# Patient Record
Sex: FEMALE | Race: WHITE | NOT HISPANIC OR LATINO | URBAN - METROPOLITAN AREA
[De-identification: names, ages, dates, MRNs, and addresses within clinical notes are randomized per-mention and may not be internally consistent; named-entity substitution may affect disease eponyms.]

---

## 2021-11-15 ENCOUNTER — APPOINTMENT (RX ONLY)
Dept: URBAN - METROPOLITAN AREA CLINIC 307 | Facility: CLINIC | Age: 46
Setting detail: DERMATOLOGY
End: 2021-11-15

## 2021-11-15 DIAGNOSIS — Z85.828 PERSONAL HISTORY OF OTHER MALIGNANT NEOPLASM OF SKIN: ICD-10-CM

## 2021-11-15 DIAGNOSIS — L73.2 HIDRADENITIS SUPPURATIVA: ICD-10-CM | Status: WORSENING

## 2021-11-15 DIAGNOSIS — L91.8 OTHER HYPERTROPHIC DISORDERS OF THE SKIN: ICD-10-CM

## 2021-11-15 PROCEDURE — ? PRESCRIPTION MEDICATION MANAGEMENT

## 2021-11-15 PROCEDURE — 99204 OFFICE O/P NEW MOD 45 MIN: CPT | Mod: 25

## 2021-11-15 PROCEDURE — ? COUNSELING

## 2021-11-15 PROCEDURE — ? ORDER TESTS

## 2021-11-15 PROCEDURE — 11200 RMVL SKIN TAGS UP TO&INC 15: CPT

## 2021-11-15 PROCEDURE — ? SKIN TAG REMOVAL

## 2021-11-15 ASSESSMENT — LOCATION DETAILED DESCRIPTION DERM
LOCATION DETAILED: MONS PUBIS
LOCATION DETAILED: LEFT AXILLARY VAULT
LOCATION DETAILED: RIGHT LATERAL EYEBROW
LOCATION DETAILED: LEFT CENTRAL EYEBROW
LOCATION DETAILED: LEFT SUPRAPUBIC SKIN
LOCATION DETAILED: RIGHT POSTERIOR AXILLA

## 2021-11-15 ASSESSMENT — LOCATION SIMPLE DESCRIPTION DERM
LOCATION SIMPLE: LEFT AXILLARY VAULT
LOCATION SIMPLE: RIGHT POSTERIOR AXILLA
LOCATION SIMPLE: RIGHT EYEBROW
LOCATION SIMPLE: GROIN
LOCATION SIMPLE: LEFT EYEBROW
LOCATION SIMPLE: GROIN

## 2021-11-15 ASSESSMENT — LOCATION ZONE DERM
LOCATION ZONE: TRUNK
LOCATION ZONE: FACE
LOCATION ZONE: AXILLAE
LOCATION ZONE: VULVA

## 2021-11-15 NOTE — PROCEDURE: SKIN TAG REMOVAL
Include Z78.9 (Other Specified Conditions Influencing Health Status) As An Associated Diagnosis?: No
Add Associated Diagnoses If Applicable When Selecting Medical Necessity: Yes
Consent: Written consent obtained and the risks of skin tag removal was reviewed with the patient including but not limited to bleeding, pigmentary change, infection, pain, and remote possibility of scarring.
Anesthesia Type: 1% lidocaine with epinephrine
Anesthesia Volume In Cc: 3
Medical Necessity Clause: This procedure was medically necessary because the lesions that were treated were: irritated and catching
Medical Necessity Information: It is in your best interest to select a reason for this procedure from the list below. All of these items fulfill various CMS LCD requirements except the new and changing color options.
Detail Level: Detailed

## 2021-11-15 NOTE — HPI: RASH (HIDRADENITIS SUPPURATIVA)
How Severe Is It?: mild
Is This A New Presentation, Or A Follow-Up?: Rash
Additional History: Patient states she got the COVID-19 Zeyad and Zeyad vaccine on October 2nd and HS has gotten worse since receiving vaccine. PCP has injected rosefin antibiotic twice in the last 6 weeks

## 2022-01-17 ENCOUNTER — RX ONLY (OUTPATIENT)
Age: 47
Setting detail: RX ONLY
End: 2022-01-17

## 2022-01-17 RX ORDER — ADALIMUMAB 80MG/0.8ML
KIT SUBCUTANEOUS
Qty: 1 | Refills: 3 | Status: ERX | COMMUNITY
Start: 2022-01-17

## 2022-09-16 NOTE — PROCEDURE: ORDER TESTS
GI FOLLOW UP    INTERVAL HISTORY:       Status post colonoscopy with EMR of sessile polyp in the left-sided colon  Denies any significant GI symptoms  Pending evaluation by colorectal surgery for condyloma resection    No chief complaint on file. No diagnosis found. HISTORY OF PRESENT ILLNESS: Guzman Reyes is a 47 y.o. female with a past history remarkable for hypertension, depression, COPD, alcoholism with dependency and recent mission for withdrawal symptoms, additionally admitted for intoxication COPD exacerbation identified to have anemia with fecal occult positive results, underwent EGD and colonoscopy (poor prep) that revealed no obvious upper GI sources of the patient's anemia however did reveal severe condyloma involving the anorectal region and general region. Patient was referred to colorectal surgery and is undergoing evaluation to have her severe condyloma acuminata removed and treated for. Patient will need a repeat colonoscopy with extended bowel prep when she is treated for condyloma soon as possible as she has a residual flat polyps identified on her index colonoscopy that need to be resected. Is unlikely the patient is unable to tolerate bowel prep given that she has endoscopic evidence of severe gastroparesis. Her most recent upper endoscopy revealed significant to moderate amount of residual gastric food undigested suggestive of gastroparesis     Repeat upper endoscopy was performed abnormal esophagram which revealed a possible intraluminal narrowing in the midesophagus possible related to aortic arch compression. EGD revealed possible extraluminal narrowing in the midesophagus but no intraluminal lesions that were obvious.   Stomach again was filled with partially digested food suggestive of gastroparesis versus nonadherence to dietary instructions versus
09/29/2021    [place] [place]-last visit sep 2021    Under care of team 09/29/2021    pain management-Hamzah Martines-[facility]-last visit sep 2021    Under care of team     pulmonology-Dr Due[facility]as-[facility]-due for visit March 2022    Under care of team 09/29/2021    psych-bahnfeldt NP-telemed-last visit 10/ 2021    Under care of team 09/29/2021    yw-[address]-last visit aug 2021    Under care of team     NEPHROLOGY - DR. LEE    Wears glasses     Wears partial dentures     UPPER    Wellness examination     pcp-Ludivina grimes-last visit Jan 5, 2022       Past Surgical History:   Procedure Laterality Date    ANUS SURGERY N/A 01/25/2022    EXCISION AND FULGURATION, ANAL, VAGINAL AND PERIANAL WARTS WITH CO2 LASER, FORTEC performed by Reji De La Rosa MD at [facility] [address] times 2    COLONOSCOPY N/A 10/22/2020    COLONOSCOPY DIAGNOSTIC performed by Thor North MD at [facility]    COLONOSCOPY N/A 11/19/2021    COLONOSCOPY W/ ENDOSCOPIC MUCOSAL RESECTION performed by Thor North MD at [facility]    [address]  07/28/2021    CT ABSCESS DRAIN SUBCUTANEOUS 7/28/2021 [facility] CT SCAN    ENDOSCOPIC ULTRASOUND (LOWER) N/A 12/09/2020    ENDOSCOPIC ULTRASOUND, UPPER WITH LINEAR SCOPE FOR BIOPSY OF MASS ON HEAD OF PANCREAS performed by Meg Galvan MD at [facility] (UPPER)  02/09/2022    ENDOSCOPIC ULTRASOUND, EGD - GI SCHEDULED,EGD STENT REMOVAL    ERCP N/A 08/11/2021    ERCP STENT INSERTION performed by Yaquelin Heard MD at [facility]    ERCP  08/11/2021    ERCP SPHINCTER/PAPILLOTOMY performed by Yaquelin Heard MD at [facility]    ERCP N/A 10/21/2021    ERCP STENT REMOVAL/EXCHANGE [facility]    ERCP  10/21/2021    ERCP STONE REMOVAL performed by Yaquelin Heard MD at [place] [address]    ERCP  10/21/2021    ERCP ENDOSCOPIC RETROGRADE CHOLANGIOPANCREATOGRAPHY
DIRECT VISUALIZATION performed by Mabel Bynum MD at [address] [facility] Left 07/03/2013    ORIF tibial plateau    FRACTURE SURGERY Right     small finger metacarpal fracture    HAND SURGERY      HYSTERECTOMY (CERVIX STATUS UNKNOWN)  2003    SPINE SURGERY N/A 09/10/2021    KYPHOPLASTY lumbar L5 performed by Elle Mishra MD at [address] [facility] 10/22/2020    EGD BIOPSY performed by Kye Lam MD at [address] [place] [address] 04/05/2021    EGD BIOPSY performed by Kye Lam MD at [facility] N/A 09/08/2021    ENDOSCOPIC ULTRASOUND, LINEAR SCOPE performed by Devin Mancia MD at [address] [facility] N/A 2/9/2022    ENDOSCOPIC ULTRASOUND, EGD - GI SCHEDULED performed by Mabel Bynum MD at [address] [facility]  2/9/2022    EGD STENT REMOVAL performed by Mabel Bynum MD at [address] [facility]:    Current Outpatient Medications:     lipase-protease-amylase (CREON) 98642-76444 units delayed release capsule, TAKE ONE CAPSULE BY MOUTH THREE TIMES A DAY WITH MEALS, Disp: 90 capsule, Rfl: 2    amoxicillin (AMOXIL) 875 MG tablet, Take 1 tablet by mouth 2 times daily for 10 days, Disp: 20 tablet, Rfl: 0    atorvastatin (LIPITOR) 20 MG tablet, Take 1 tablet by mouth daily, Disp: 30 tablet, Rfl: 3    mirtazapine (REMERON) 15 MG tablet, , Disp: , Rfl:     REXULTI 1 MG TABS tablet, , Disp: , Rfl:     topiramate (TOPAMAX) 50 MG tablet, Take 1 tablet by mouth 2 times daily, Disp: 60 tablet, Rfl: 5    sucralfate (CARAFATE) 1 GM tablet, TAKE ONE TABLET BY MOUTH FOUR TIMES A DAY, Disp: 120 tablet, Rfl: 3    busPIRone (BUSPAR) 30 MG tablet, Take 1 tablet by mouth in the morning and at bedtime, Disp: , Rfl:     solifenacin (VESICARE) 10 MG tablet, Take 1 tablet by mouth in the morning., Disp: , Rfl:     methylPREDNISolone (MEDROL DOSEPACK) 4 MG tablet, Take
by mouth., Disp: 1 kit, Rfl: 0    metoclopramide (REGLAN) 5 MG tablet, TAKE ONE TABLET BY MOUTH THREE TIMES A DAY, Disp: 90 tablet, Rfl: 3    omeprazole (PRILOSEC) 40 MG delayed release capsule, TAKE ONE CAPSULE BY MOUTH TWICE A DAY, Disp: 60 capsule, Rfl: 2    rOPINIRole (REQUIP) 1 MG tablet, TAKE ONE TABLET BY MOUTH ONCE NIGHTLY, Disp: 90 tablet, Rfl: 1    carBAMazepine (TEGRETOL) 200 MG tablet, TAKE ONE TABLET BY MOUTH THREE TIMES A DAY, Disp: 90 tablet, Rfl: 5    hydrOXYzine (ATARAX) 25 MG tablet, Take 1 tablet by mouth 3 times daily as needed for Itching, Disp: 90 tablet, Rfl: 0    Handicap Placard MISC, by Does not apply route Expires 5/2027, Disp: 1 each, Rfl: 0    prazosin (MINIPRESS) 1 MG capsule, Take 1 capsule by mouth nightly, Disp: 30 capsule, Rfl: 3    amLODIPine (NORVASC) 10 MG tablet, TAKE ONE TABLET BY MOUTH DAILY, Disp: 90 tablet, Rfl: 1    nicotine polacrilex (NICOTINE MINI) 4 MG lozenge, Take 1 lozenge by mouth as needed for Smoking cessation Weeks 1 to 6: 1 lozenge every 1 to 2 hours. Weeks 7 to 9: 1 lozenge every 2 to 4 hours. Weeks 10 to 12: 1 lozenge every 4 to 8 hours. Maximum 20 lozenges per day., Disp: 100 each, Rfl: 3    sodium chloride 1 g tablet, Take 1 tablet by mouth 4 times daily Note increase in dose per Dr Oneida Nicole, Disp: 360 tablet, Rfl: 3    verapamil (CALAN SR) 120 MG extended release tablet, Take 1 tablet by mouth daily, Disp: 90 tablet, Rfl: 1    senna (SENOKOT) 8.6 MG tablet, Take 1 tablet by mouth 2 times daily, Disp: 60 tablet, Rfl: 11    lidocaine (XYLOCAINE) 5 % ointment, Apply topically as needed. , Disp: 30 g, Rfl: 1    fluticasone-umeclidin-vilant (TRELEGY ELLIPTA) 100-62.5-25 MCG/INH AEPB, Inhale 1 puff into the lungs daily, Disp: 1 each, Rfl: 5    simethicone (MYLICON) 80 MG chewable tablet, Take 1 tablet by mouth 4 times daily as needed for Flatulence, Disp: 180 tablet, Rfl: 3    albuterol sulfate HFA (VENTOLIN HFA) 108 (90 Base) MCG/ACT inhaler, Inhale 2 puffs into
Partner Violence: Not on file   Housing Stability: Not on file       REVIEW OF SYSTEMS: A 12-point review of systems was obtained and pertinent positives and negatives were listed below. REVIEW OF SYSTEMS:     Constitutional: No fever, no chills, no lethargy, no weakness. HEENT:  No headache, otalgia, itchy eyes, nasal discharge or sore throat. Cardiac:  No chest pain, dyspnea, orthopnea or PND. Chest:   No cough, phlegm or wheezing. Abdomen:      Detailed by MA   Neuro:  No focal weakness, abnormal movements or seizure like activity. Skin:   No rashes, no itching. :   No hematuria, no pyuria, no dysuria, no flank pain. Extremities:  No swelling or joint pains. ROS was otherwise negative    Review of Systems   Constitutional:  Positive for fatigue. Negative for unexpected weight change. HENT:  Positive for trouble swallowing (saliva). Eyes: Negative. Respiratory:  Positive for choking. Cardiovascular: Negative. Gastrointestinal:  Positive for abdominal distention, abdominal pain and constipation. Negative for blood in stool, rectal pain and vomiting. Anal bleeding: hemorrhoids. Endocrine: Negative. Genitourinary: Negative. Musculoskeletal:  Positive for gait problem. Skin: Negative. Allergic/Immunologic: Negative. Neurological:  Positive for dizziness, light-headedness and headaches. Hematological: Negative. Psychiatric/Behavioral:  Negative for sleep disturbance. All other systems reviewed and are negative. PHYSICAL EXAMINATION: Vital signs reviewed per the nursing documentation. There were no vitals taken for this visit. There is no height or weight on file to calculate BMI. Physical Exam    Physical Exam   Constitutional: Patient is oriented to person, place, and time. Patient appears well-developed and well-nourished. HENT:   Head: Normocephalic and atraumatic. Eyes: Pupils are equal, round, and reactive to light.  EOM are normal.   Neck: Normal
range of motion. Neck supple. No JVD present. No tracheal deviation present. No thyromegaly present. Cardiovascular: Normal rate, regular rhythm, normal heart sounds and intact distal pulses. Pulmonary/Chest: Effort normal and breath sounds normal. No stridor. No respiratory distress. He has no wheezes. He has no rales. He exhibits no tenderness. Abdominal: Soft. Bowel sounds are normal. He exhibits no distension and no mass. There is no tenderness. There is no rebound and no guarding. No hernia. Musculoskeletal: Normal range of motion. Lymphadenopathy:    Patient has no cervical adenopathy. Neurological: Patient is alert and oriented to person, place, and time. Psychiatric: Patient has a normal mood and affect. Patient behavior is normal.       LABORATORY DATA: Reviewed  Lab Results   Component Value Date    WBC 7.9 09/01/2022    HGB 13.4 09/01/2022    HCT 41.0 09/01/2022    MCV 90.7 09/01/2022     (H) 09/01/2022     09/01/2022    K 4.2 09/01/2022     09/01/2022    CO2 27 09/01/2022    BUN 9 09/01/2022    CREATININE 0.44 (L) 09/01/2022    LABALBU 4.1 09/01/2022    BILITOT 0.2 (L) 09/01/2022    ALKPHOS 237 (H) 09/01/2022    AST 17 09/01/2022    ALT 8 09/01/2022    INR 1.0 08/09/2021         Lab Results   Component Value Date    RBC 4.52 09/01/2022    HGB 13.4 09/01/2022    MCV 90.7 09/01/2022    MCH 29.6 09/01/2022    MCHC 32.7 09/01/2022    RDW 18.0 (H) 09/01/2022    MPV 10.5 09/01/2022    BASOPCT 0 09/01/2022    LYMPHSABS 1.81 09/01/2022    MONOSABS 0.90 09/01/2022    NEUTROABS 5.17 09/01/2022    EOSABS <0.03 09/01/2022    BASOSABS <0.03 09/01/2022         DIAGNOSTIC TESTING:     MRI BRAIN W WO CONTRAST    Result Date: 12/31/2021  EXAMINATION: MRI OF THE BRAIN WITHOUT AND WITH CONTRAST  12/31/2021 3:36 pm TECHNIQUE: Multiplanar multisequence MRI of the head/brain was performed without and with the administration of intravenous contrast. COMPARISON: CT brain performed 07/25/2021.
HISTORY: ORDERING SYSTEM PROVIDED HISTORY: Nystagmus TECHNOLOGIST PROVIDED HISTORY: New onset persistent horizontal nystagmus; unprovoked fatigue; white matter abnormality on prior brain MRI; ?  Demyelinating disease Is the patient pregnant?->No Reason for Exam: New onset persistent horizontal nystagmus; unprovoked fatigue; white matter abnormality on prior brain MRI; ?  Demyelinating disease, Nystagmus, Other fatigue, White matter abnormality on MRI of brain FINDINGS: INTRACRANIAL STRUCTURES/VENTRICLES:  The sellar and suprasellar structures, optic chiasm, corpus callosum, pineal gland, tectum, and midline brainstem structures are unremarkable. The craniocervical junction is unremarkable. There is no acute hemorrhage, mass effect, or midline shift. There is satisfactory overall gray-white matter differentiation. There is chronic microvascular disease. There is an area of remote parenchymal change in the right parietal lobe due to prior shunt tubing. The ventricular structures are symmetric and unremarkable. The infratentorial structures including the cerebellopontine angles and internal auditory canals are unremarkable. There is no abnormal restricted diffusion. There is no abnormal blooming artifact on susceptibility weighted imaging. There is no abnormal postcontrast enhancement. ORBITS: The visualized portion of the orbits demonstrate no acute abnormality. SINUSES: The visualized paranasal sinuses and mastoid air cells demonstrate no acute abnormality. BONES/SOFT TISSUES: The bone marrow signal intensity appears normal. The soft tissues demonstrate no acute abnormality. No acute intracranial abnormality. No abnormal postcontrast enhancement.  RECOMMENDATIONS: Unavailable              IMPRESSION: Sarthak Diaz is a 47 y.o. female with a past history remarkable for hypertension, depression, COPD, alcoholism with dependency and recent mission for withdrawal symptoms, additionally admitted for
intoxication COPD exacerbation identified to have anemia with fecal occult positive results, underwent EGD and colonoscopy (poor prep) that revealed no obvious upper GI sources of the patient's anemia however did reveal severe condyloma involving the anorectal region and general region. Patient was referred to colorectal surgery and is undergoing evaluation to have her severe condyloma acuminata removed and treated for. Patient will need a repeat colonoscopy with extended bowel prep when she is treated for condyloma soon as possible as she has a residual flat polyps identified on her index colonoscopy that need to be resected. Is unlikely the patient is unable to tolerate bowel prep given that she has endoscopic evidence of severe gastroparesis. Her most recent upper endoscopy revealed significant to moderate amount of residual gastric food undigested suggestive of gastroparesis     Repeat upper endoscopy was performed abnormal esophagram which revealed a possible intraluminal narrowing in the midesophagus possible related to aortic arch compression. EGD revealed possible extraluminal narrowing in the midesophagus but no intraluminal lesions that were obvious. Stomach again was filled with partially digested food suggestive of gastroparesis versus nonadherence to dietary instructions versus medication induced poor contractility     Currently the patient reports a difficulty initiating swallowing in the oropharyngeal region.   Does not report any globus sensation but does not report any esophageal symptoms     CT of the chest was negative for any extraluminal compression of the midesophagus  Swallowing study was unremarkable  Emptying test identified evidence of gastroparesis     Interval history:  Recent complicated hospital course identified by a possible peripancreatic fluid collection in the setting of either pancreatic duct leak versus acute on chronic pancreatitis related to chronic pancreatitis and
PD stricturing  Status post ERCP with pancreatic duct stent-5 [place] 9 cm single pigtail  Underwent percutaneous drain peripancreatic fluid with recent removal  Clinically doing well from GI standpoint with mild unintentional weight loss  Repeat ERCP with attempted spyglass and pancreatic duct stone removal with PD stent placement on 10/21/2021  Patient reports some dysphagia and reflux symptoms    Assessment  No diagnosis found. PLAN:    1) anorectal genital condyloma-follow up with CR surgery. Likely plan for surgical resection    2) repeat colonoscopy for post EMR surveillance recommended in 4 months. Currently denies any lower GI symptoms    3) chronic pancreatitis with pancreatic duct calcified stone status post removal with ERCP placement of PD stent-plan on repeat ERCP plus minus EUS with stent removal.  Stent for diagnostic EGD given patient's intermittent slow progressive dysphagia symptoms. Possible esophagitis. We will add Carafate 1 g 4 times daily. Strongly by smoking cessation. Patient continue with Prilosec 40 mg twice daily. Thank you for allowing me to participate in the care of Ms. Ana Montelongo. For any further questions please do not hesitate to contact me. I have reviewed and agree with the ROS entered by the MA/LPN from today's encounter documented in a separate note. Etser Yu MD, MPH   [facility]  Office #: [phone]    this note is created with the assistance of a speech recognition program.  While intending to generate a document that actually reflects the content of the visit, the document can still have some errors including those of syntax and sound a like substitutions which may escape proof reading. It such instances, actual meaning can be extrapolated by contextual diversion.
Billing Type: Third-Party Bill
Bill For Surgical Tray: no
Expected Date Of Service: 11/17/2021
Performing Laboratory: 0